# Patient Record
Sex: MALE | Race: WHITE | NOT HISPANIC OR LATINO | Employment: FULL TIME | URBAN - METROPOLITAN AREA
[De-identification: names, ages, dates, MRNs, and addresses within clinical notes are randomized per-mention and may not be internally consistent; named-entity substitution may affect disease eponyms.]

---

## 2017-01-20 DIAGNOSIS — F41.1 GENERALIZED ANXIETY DISORDER: Primary | ICD-10-CM

## 2017-01-20 RX ORDER — PAROXETINE 20 MG/1
20 TABLET, FILM COATED ORAL AT BEDTIME
Qty: 30 TABLET | Refills: 5 | Status: SHIPPED | OUTPATIENT
Start: 2017-01-20 | End: 2017-07-10

## 2017-01-20 NOTE — TELEPHONE ENCOUNTER
PAROXETINE 20MG          Last Written Prescription Date: 11/30/2016  Last Fill Quantity: 30, # refills: 0  [place] Office Visit with [facility] [place]:  11/2/16        Last PHQ-9 score on record=   PHQ-9 SCORE 7/29/2016   Total Score -   Total Score MyChart -   Total Score 9

## 2017-01-20 NOTE — TELEPHONE ENCOUNTER
Patient takes this for anxiety.  Prescription approved per [facility] Refill Protocol.  Sandra Lala, RN  Triage Nurse

## 2017-03-13 ENCOUNTER — OFFICE VISIT (OUTPATIENT)
Dept: FAMILY MEDICINE | Facility: CLINIC | Age: 37
End: 2017-03-13
Payer: COMMERCIAL

## 2017-03-13 VITALS
DIASTOLIC BLOOD PRESSURE: 75 MMHG | SYSTOLIC BLOOD PRESSURE: 104 MMHG | HEART RATE: 79 BPM | BODY MASS INDEX: 32.54 KG/M2 | TEMPERATURE: 98.5 F | WEIGHT: 214 LBS | RESPIRATION RATE: 18 BRPM | OXYGEN SATURATION: 98 %

## 2017-03-13 DIAGNOSIS — K44.9 HIATAL HERNIA: ICD-10-CM

## 2017-03-13 DIAGNOSIS — F33.0 MILD RECURRENT MAJOR DEPRESSION (H): ICD-10-CM

## 2017-03-13 DIAGNOSIS — M25.522 LEFT ELBOW PAIN: Primary | ICD-10-CM

## 2017-03-13 DIAGNOSIS — K21.9 GASTROESOPHAGEAL REFLUX DISEASE WITHOUT ESOPHAGITIS: ICD-10-CM

## 2017-03-13 PROCEDURE — 99214 OFFICE O/P EST MOD 30 MIN: CPT | Performed by: FAMILY MEDICINE

## 2017-03-13 RX ORDER — TRAMADOL HYDROCHLORIDE 50 MG/1
50-100 TABLET ORAL
Qty: 20 TABLET | Refills: 0 | Status: SHIPPED | OUTPATIENT
Start: 2017-03-13 | End: 2018-02-28

## 2017-03-13 NOTE — NURSING NOTE
"Chief Complaint   Patient presents with     Musculoskeletal Problem     sore elbows        Initial /75 (BP Location: Right arm, Patient Position: Chair, Cuff Size: Adult Regular)  Pulse 79  Temp 98.5  F (36.9  C) (Oral)  Resp 18  Wt 214 lb (97.1 kg)  SpO2 98%  BMI 32.54 kg/m2 Estimated body mass index is 32.54 kg/(m^2) as calculated from the following:    Height as of 12/29/15: 5' 8\" (1.727 m).    Weight as of this encounter: 214 lb (97.1 kg).  Medication Reconciliation: complete     Dane Wills MA      "

## 2017-03-13 NOTE — PATIENT INSTRUCTIONS
Tylenol 500 to 1000 mg every 8 hours as needed for pain, maximum daily dose is 3, 000 mg   Tramadol 50 to 100 mg every night as needed for pain, please do not drive or take it with alcohol  Continue physical therapy exercises  If your pain is not improving after 1-2 weeks, then I will refer you to the Sports medicine clinic for further evaluation

## 2017-03-13 NOTE — MR AVS SNAPSHOT
After Visit Summary   3/13/2017    Easton Coyne    MRN: [id number]           Patient Information     Date Of Birth          1980        Visit Information        Provider Department      3/13/2017 3:40 PM Flor Greenfield MD [facility]        Today's Diagnoses     Left elbow pain    -  1      Care Instructions    Tylenol 500 to 1000 mg every 8 hours as needed for pain, maximum daily dose is 3, 000 mg   Tramadol 50 to 100 mg every night as needed for pain, please do not drive or take it with alcohol  Continue physical therapy exercises  If your pain is not improving after 1-2 weeks, then I will refer you to the Sports medicine clinic for further evaluation         Follow-ups after your visit        Who to contact     If you have questions or need follow up information about today's clinic visit or your schedule please contact [facility] directly at [phone].  Normal or non-critical lab and imaging results will be communicated to you by MyChart, letter or phone within 4 business days after the clinic has received the results. If you do not hear from us within 7 days, please contact the clinic through [employer] or phone. If you have a critical or abnormal lab result, we will notify you by phone as soon as possible.  Submit refill requests through [employer] or call your pharmacy and they will forward the refill request to us. Please allow 3 business days for your refill to be completed.          Additional Information About Your Visit        MyChart Information     [employer] gives you secure access to your electronic health record. If you see a primary care provider, you can also send messages to your care team and make appointments. If you have questions, please call your primary care clinic.  If you do not have a primary care provider, please call [phone] and they will assist you.        Care EveryWhere ID     This is your Care EveryWhere ID. This could be used  by other organizations to access your [facility] medical records  [id number]        Your Vitals Were     Pulse Temperature Respirations Pulse Oximetry BMI (Body Mass Index)       79 98.5  F (36.9  C) (Oral) 18 98% 32.54 kg/m2        Blood Pressure from Last 3 Encounters:   03/13/17 104/75   11/22/16 128/68   11/02/16 120/80    Weight from Last 3 Encounters:   03/13/17 214 lb (97.1 kg)   11/22/16 204 lb (92.5 kg)   11/02/16 209 lb (94.8 kg)              Today, you had the following     No orders found for display         Today's Medication Changes          These changes are accurate as of: 3/13/17  4:20 PM.  If you have any questions, ask your nurse or doctor.               Start taking these medicines.        Dose/Directions    traMADol 50 MG tablet   Commonly known as:  ULTRAM   Used for:  Left elbow pain   Started by:  Flor Greenfield MD        Dose:   mg   Take 1-2 tablets ( mg) by mouth nightly as needed for pain maximum 2 tablet(s) per day   Quantity:  20 tablet   Refills:  0            Where to get your medicines      Some of these will need a paper prescription and others can be bought over the counter.  Ask your nurse if you have questions.     Bring a paper prescription for each of these medications     traMADol 50 MG tablet                Primary Care Provider    None Specified       No primary provider on file.        Thank you!     Thank you for choosing [facility]  for your care. Our goal is always to provide you with excellent care. Hearing back from our patients is one way we can continue to improve our services. Please take a few minutes to complete the written survey that you may receive in the mail after your visit with us. Thank you!             Your Updated Medication List - Protect others around you: Learn how to safely use, store and throw away your medicines at www.disposemymeds.org.          This list is accurate as of: 3/13/17  4:20 PM.  Always use your most  recent med list.                   Brand Name Dispense Instructions for use    cyclobenzaprine 10 MG tablet    FLEXERIL    30 tablet    Take 1 tablet (10 mg) by mouth 3 times daily as needed for muscle spasms       LORazepam 0.5 MG tablet    ATIVAN    20 tablet    Take 0.5-1 tablets (0.25-0.5 mg) by mouth every 8 hours as needed for anxiety Take 30 minutes prior to departure.  Do not operate a vehicle after taking this medication       PARoxetine 20 MG tablet    PAXIL    30 tablet    Take 1 tablet (20 mg) by mouth At Bedtime       ranitidine 150 MG tablet    ZANTAC    60 tablet    Take 1 tablet (150 mg) by mouth 2 times daily       traMADol 50 MG tablet    ULTRAM    20 tablet    Take 1-2 tablets ( mg) by mouth nightly as needed for pain maximum 2 tablet(s) per day       varenicline 0.5 MG X 11 & 1 MG X 42 tablet    CHANTIX STARTING MONTH LING    53 tablet    Take 0.5 mg tab daily for 3 days, then 0.5 mg tab twice daily for 4 days, then 1 mg twice daily.

## 2017-03-13 NOTE — PROGRESS NOTES
SUBJECTIVE:                                                    Easton Coyne is a 36 year old male who presents to clinic today for the following health issues:      Musculoskeletal problem/pain      Duration: 2 weeks ago     Description  Location: right elbow, intermittent, worse at night and in the morning     Intensity:  severe    Accompanying signs and symptoms: swelling     History  Previous similar problem: yes, elbow tendonitis, PT didn't help, cortisone mildly helped, rest helped the most   Previous surgery for pinched nerves   Previous evaluation:  No recent     Precipitating or alleviating factors:  Trauma or overuse: no   Aggravating factors include: lifting and moving arm     Therapies tried and outcome: tendon band, ice and tiger balm- not improving    Pt is a .       Problem list and histories reviewed & adjusted, as indicated.  Additional history: as documented        Reviewed and updated as needed this visit by clinical staff       Reviewed and updated as needed this visit by Provider         ROS:  Constitutional, HEENT, cardiovascular, pulmonary, gi and gu systems are negative, except as otherwise noted.    OBJECTIVE:                                                    /75 (BP Location: Right arm, Patient Position: Chair, Cuff Size: Adult Regular)  Pulse 79  Temp 98.5  F (36.9  C) (Oral)  Resp 18  Wt 214 lb (97.1 kg)  SpO2 98%  BMI 32.54 kg/m2  Body mass index is 32.54 kg/(m^2).  GENERAL: healthy, alert and no distress  EYES: Eyes grossly normal to inspection  HENT: nose and mouth without ulcers or lesions  MS: no gross musculoskeletal defects noted, no edema, + left elbow tenderness, normal ROM  PSYCH: mentation appears normal, affect normal/bright    Diagnostic Test Results:  none      ASSESSMENT/PLAN:                                                      ## Left elbow pain  - H/o GERD and hiatal hernia and intolerant to NSAIDs; likely symptoms are due to meningopathy  - Due to above  h/o, will avoid NSAIDs and use Tylenol 500 to 1000 mg every 8 hours as needed for pain   - traMADol (ULTRAM) 50 MG tablet; Take 1-2 tablets ( mg) by mouth nightly as needed for pain maximum 2 tablet(s) per day  Dispense: 20 tablet; Refill: 0; cautioned about sedating side effect, not to drive or drink alcohol while on medication.   - If not improving then will refer to sports medicine clinic     ## MDD:  PHQ-9 SCORE 2/29/2016 7/29/2016 3/13/2017   Total Score - - -   Total Score MyChart - - -   Total Score 7 9 3     - Pt due for PHQ9, stable, continue current antidepressant       Flor Greenfield MD  [facility]

## 2017-03-14 ASSESSMENT — PATIENT HEALTH QUESTIONNAIRE - PHQ9: SUM OF ALL RESPONSES TO PHQ QUESTIONS 1-9: 3

## 2017-07-10 DIAGNOSIS — F41.1 GENERALIZED ANXIETY DISORDER: ICD-10-CM

## 2017-07-10 NOTE — TELEPHONE ENCOUNTER
PAROXETINE 20mg     Last Written Prescription Date: 1/20/2017  Last Fill Quantity: 30, # refills: 5  Last Office Visit with [facility] primary care provider:  3/13/2017        Last PHQ-9 score on record=   PHQ-9 SCORE 3/13/2017   Total Score MyChart -   Total Score 3

## 2017-07-12 RX ORDER — PAROXETINE 20 MG/1
20 TABLET, FILM COATED ORAL AT BEDTIME
Qty: 30 TABLET | Refills: 5 | Status: SHIPPED | OUTPATIENT
Start: 2017-07-12 | End: 2018-01-22

## 2017-07-12 NOTE — TELEPHONE ENCOUNTER
Prescription approved per [facility] Refill Protocol.  Patient takes this for anxiety.  Sandra Lala, RN  Triage Nurse

## 2017-12-05 ENCOUNTER — TRANSFERRED RECORDS (OUTPATIENT)
Dept: HEALTH INFORMATION MANAGEMENT | Facility: CLINIC | Age: 37
End: 2017-12-05

## 2017-12-22 ENCOUNTER — TRANSFERRED RECORDS (OUTPATIENT)
Dept: HEALTH INFORMATION MANAGEMENT | Facility: CLINIC | Age: 37
End: 2017-12-22

## 2018-01-08 ENCOUNTER — TRANSFERRED RECORDS (OUTPATIENT)
Dept: HEALTH INFORMATION MANAGEMENT | Facility: CLINIC | Age: 38
End: 2018-01-08

## 2018-01-19 DIAGNOSIS — M79.18 MYOFASCIAL PAIN: ICD-10-CM

## 2018-01-19 DIAGNOSIS — F41.1 GENERALIZED ANXIETY DISORDER: ICD-10-CM

## 2018-01-19 RX ORDER — CYCLOBENZAPRINE HCL 10 MG
10 TABLET ORAL 3 TIMES DAILY PRN
Qty: 30 TABLET | Refills: 0 | Status: SHIPPED | OUTPATIENT
Start: 2018-01-19

## 2018-01-19 NOTE — TELEPHONE ENCOUNTER
"Requested Prescriptions   Pending Prescriptions Disp Refills     PARoxetine (PAXIL) 20 MG tablet [Pharmacy Med Name: PAROXETINE 20MG TABLETS]  Last Written Prescription Date:  07/12/2017  Last Fill Quantity: 30 tablets,  # refills: 5   Last Office Visit with [facility] or [facility] prescribing provider: 03/13/2017  Future Office Visit:    30 tablet 0     Sig: TAKE 1 TABLET(20 MG) BY MOUTH AT BEDTIME    SSRIs Protocol Failed    1/19/2018  1:49 PM   PHQ-9 SCORE 2/29/2016 7/29/2016 3/13/2017   Total Score - - -   Total Score MyChart - - -   Total Score 7 9 3     DELROY-7 SCORE 8/28/2015 7/29/2016 11/22/2016   Total Score - - -   Total Score 1 12 7           Failed - PHQ-9 score less than 5 in past 6 months    The PHQ-9 criteria is meant to fail. It requires a PHQ-9 score review         Failed - Recent or future visit with authorizing provider    Patient had office visit in the last year or has a visit in the next 30 days with authorizing provider.  See \"Patient Info\" tab in inbasket, or \"Choose Columns\" in Meds & Orders section of the refill encounter.            Failed - Recent (6 mo) or future visit with authorizing provider's specialty    Patient had office visit in the last 6 months or has a visit in the next 30 days with authorizing provider.  See \"Patient Info\" tab in inbasket, or \"Choose Columns\" in Meds & Orders section of the refill encounter.           Passed - Patient is age 18 or older          "

## 2018-01-20 DIAGNOSIS — F41.1 GENERALIZED ANXIETY DISORDER: ICD-10-CM

## 2018-01-20 NOTE — TELEPHONE ENCOUNTER
"Requested Prescriptions   Pending Prescriptions Disp Refills     PARoxetine (PAXIL) 20 MG tablet [Pharmacy Med Name: PAROXETINE 20MG TABLETS]  Last Written Prescription Date:  7/12/17  Last Fill Quantity: 30,  # refills: 5   Last Office Visit with [facility] or [facility] prescribing provider:  3/13/17   Future Office Visit:      30 tablet 0     Sig: TAKE 1 TABLET(20 MG) BY MOUTH AT BEDTIME    SSRIs Protocol Failed    1/20/2018  4:21 PM   PHQ-9 SCORE 2/29/2016 7/29/2016 3/13/2017   Total Score - - -   Total Score MyChart - - -   Total Score 7 9 3     DELROY-7 SCORE 8/28/2015 7/29/2016 11/22/2016   Total Score - - -   Total Score 1 12 7           Failed - PHQ-9 score less than 5 in past 6 months    The PHQ-9 criteria is meant to fail. It requires a PHQ-9 score review         Failed - Recent or future visit with authorizing provider    Patient had office visit in the last year or has a visit in the next 30 days with authorizing provider.  See \"Patient Info\" tab in inbasket, or \"Choose Columns\" in Meds & Orders section of the refill encounter.            Failed - Recent (6 mo) or future visit with authorizing provider's specialty    Patient had office visit in the last 6 months or has a visit in the next 30 days with authorizing provider.  See \"Patient Info\" tab in inbasket, or \"Choose Columns\" in Meds & Orders section of the refill encounter.           Passed - Patient is age 18 or older          "

## 2018-01-21 ENCOUNTER — MYC MEDICAL ADVICE (OUTPATIENT)
Dept: PEDIATRICS | Facility: CLINIC | Age: 38
End: 2018-01-21

## 2018-01-22 DIAGNOSIS — F41.1 GENERALIZED ANXIETY DISORDER: ICD-10-CM

## 2018-01-22 RX ORDER — PAROXETINE 20 MG/1
TABLET, FILM COATED ORAL
Qty: 30 TABLET | Refills: 0 | OUTPATIENT
Start: 2018-01-22

## 2018-01-22 RX ORDER — PAROXETINE 20 MG/1
20 TABLET, FILM COATED ORAL AT BEDTIME
Qty: 90 TABLET | Refills: 0 | Status: SHIPPED | OUTPATIENT
Start: 2018-01-22 | End: 2018-02-28

## 2018-01-22 NOTE — TELEPHONE ENCOUNTER
Spoke with Dr. Day and he advised to have patient send it to the provider he is now seeing.   Trini Fair RN

## 2018-01-22 NOTE — TELEPHONE ENCOUNTER
Olgadled with Dr. Day and he advised to deny as has not been seen in 3 yrs by him.   Trini Fair RN

## 2018-02-28 ENCOUNTER — OFFICE VISIT (OUTPATIENT)
Dept: FAMILY MEDICINE | Facility: CLINIC | Age: 38
End: 2018-02-28
Payer: COMMERCIAL

## 2018-02-28 VITALS
DIASTOLIC BLOOD PRESSURE: 72 MMHG | TEMPERATURE: 97.5 F | SYSTOLIC BLOOD PRESSURE: 122 MMHG | HEIGHT: 68 IN | OXYGEN SATURATION: 97 % | BODY MASS INDEX: 34.86 KG/M2 | WEIGHT: 230 LBS | HEART RATE: 76 BPM

## 2018-02-28 DIAGNOSIS — K21.9 GASTROESOPHAGEAL REFLUX DISEASE WITHOUT ESOPHAGITIS: Primary | ICD-10-CM

## 2018-02-28 DIAGNOSIS — F41.1 GENERALIZED ANXIETY DISORDER: ICD-10-CM

## 2018-02-28 PROCEDURE — 99214 OFFICE O/P EST MOD 30 MIN: CPT | Performed by: INTERNAL MEDICINE

## 2018-02-28 RX ORDER — OMEPRAZOLE 40 MG/1
40 CAPSULE, DELAYED RELEASE ORAL DAILY
Qty: 30 CAPSULE | Refills: 1 | Status: SHIPPED | OUTPATIENT
Start: 2018-02-28 | End: 2019-06-17

## 2018-02-28 RX ORDER — PAROXETINE 20 MG/1
20 TABLET, FILM COATED ORAL AT BEDTIME
Qty: 90 TABLET | Refills: 3 | Status: SHIPPED | OUTPATIENT
Start: 2018-02-28 | End: 2019-06-04

## 2018-02-28 NOTE — PATIENT INSTRUCTIONS
"F Fermentable   O Oligosaccharides Fructans, galacto-oligosaccharides Wheat, barley, rye, onion, narinder, white part of spring onion, garlic, shallots, artichokes, beetroot, fennel, peas, chicory, pistachio, cashews, legumes, lentils, and chickpeas   D Disaccharides Lactose Milk, custard, ice cream, and yogurt   M Monosaccharides \"Free fructose\" (fructose in excess of glucose) Apples, pears, mangoes, cherries, watermelon, asparagus, sugar snap peas, honey, high-fructose corn syrup   A And   P Polyols Sorbitol, mannitol, maltitol, and xylitol Apples, pears, apricots, cherries, nectarines, peaches, plums, watermelon, mushrooms, cauliflower, artificially sweetened chewing gum and confectionery     Limiting dietary ingestion of known gas-producing foods such as cabbage, onions, broccoli, brussel sprouts, wheat, and potatoes     Try simethicone.      Take omeprazole 40mg     "

## 2018-02-28 NOTE — PROGRESS NOTES
SUBJECTIVE:   Easton Coyne is a 37 year old male who presents to clinic today for the following health issues:      ABDOMINAL PAIN     Onset: x couple months    Description:   Character: Fullness and bloating  Location: epigastric region  Radiation: None    Intensity: mild    Progression of Symptoms:  same    Accompanying Signs & Symptoms:  Fever/Chills?: no   Gas/Bloating: YES  Nausea: no   Vomitting: no   Diarrhea?: no   ConstipationYES  Dysuria or Hematuria: no   History:   Trauma: no   Previous similar pain: no    Previous tests done: none    Precipitating factors:   Does the pain change with:     Food: YES     BM: no     Urination: no     Alleviating factors:      Therapies Tried and outcome:     LMP:       Easton is here with upper abdominal bloating.  This seems to happen no matter what he eats, can last couple hours.  Belching a lot, but that does not always make him feel better.  He denies any dysphagia or odynophagia.  He has been taking omeprazole 20 mg daily for quite some time for heartburn, this works well to control the heartburn pain.  He is having daily bowel movements, and frequently constipated.  He denies nausea or vomiting.  His appetite is normal.  No recent changes with his health or medications.  He tried Gas-X over-the-counter which was not very helpful.    He had an EGD in 2016 which was unremarkable except a small hiatal hernia.  He had serum testing for H. pylori in 2015 which was also negative.    Also needs a refill of paroxetine today.  He has been taking this for years for anxiety, it works well to control symptoms.      Easton recently moved to [place] with his fiancée.  They are getting  in September.  He works as an . No kids.         Reviewed and updated as needed this visit by clinical staff  Tobacco  Allergies  Meds  Med Hx  Surg Hx  Fam Hx  Soc Hx      Reviewed and updated as needed this visit by Provider         ROS:  Const, cv, gi, psych reviewed,   "otherwise negative unless noted above.       OBJECTIVE:     /72  Pulse 76  Temp 97.5  F (36.4  C) (Tympanic)  Ht 5' 8\" (1.727 m)  Wt 230 lb (104.3 kg)  SpO2 97%  BMI 34.97 kg/m2  Body mass index is 34.97 kg/(m^2).    Gen: well appearing, pleasant man, no distress  HEENT: PERRL, sclera nonicteric, no oral lesions, MMM  Pulm: breathing comfortably, CTAB, no wheezes or rales  CV: RRR, normal S1 and S2, no murmurs  Abd: BS present, soft, nontender, nondistended  Ext:  no LE edema       ASSESSMENT/PLAN:       1. Gastroesophageal reflux disease without esophagitis  Epigastric bloating and gas.  Long history of GERD.  He has already had a recent EGD and testing for H pylori.  Recommended avoidance of certain dietary foods, see patient instructions.  Can try simethicone as well.  Trial of 4-8 weeks of increased dose of the omeprazole.  If this is not effective, may consider GI referral.  - omeprazole (PRILOSEC) 40 MG capsule; Take 1 capsule (40 mg) by mouth daily Take 30-60 minutes before a meal.  Dispense: 30 capsule; Refill: 1    2. GENERALIZED ANXIETY DIS  Doing well, refill ordered.  - PARoxetine (PAXIL) 20 MG tablet; Take 1 tablet (20 mg) by mouth At Bedtime  Dispense: 90 tablet; Refill: 3    Follow-up - let me know how he is doing in 6 weeks or so.    Georgina Adkins MD  [facility] [place]  "

## 2018-02-28 NOTE — NURSING NOTE
"Chief Complaint   Patient presents with     Gastric Problem     bloating       Initial /72  Pulse 76  Temp 97.5  F (36.4  C) (Tympanic)  Ht 5' 8\" (1.727 m)  Wt 230 lb (104.3 kg)  SpO2 97%  BMI 34.97 kg/m2 Estimated body mass index is 34.97 kg/(m^2) as calculated from the following:    Height as of this encounter: 5' 8\" (1.727 m).    Weight as of this encounter: 230 lb (104.3 kg).  Medication Reconciliation: complete   Radha Hopper CMA      "

## 2018-02-28 NOTE — MR AVS SNAPSHOT
"              After Visit Summary   2/28/2018    Easton Coyne    MRN: [id number]           Patient Information     Date Of Birth          1980        Visit Information        Provider Department      2/28/2018 3:20 PM Georgina Adkins MD [facility]        Today's Diagnoses     Gastroesophageal reflux disease without esophagitis    -  1    GENERALIZED ANXIETY DIS          Care Instructions    F Fermentable   O Oligosaccharides Fructans, galacto-oligosaccharides Wheat, barley, rye, onion, anrinder, white part of spring onion, garlic, shallots, artichokes, beetroot, fennel, peas, chicory, pistachio, cashews, legumes, lentils, and chickpeas   D Disaccharides Lactose Milk, custard, ice cream, and yogurt   M Monosaccharides \"Free fructose\" (fructose in excess of glucose) Apples, pears, mangoes, cherries, watermelon, asparagus, sugar snap peas, honey, high-fructose corn syrup   A And   P Polyols Sorbitol, mannitol, maltitol, and xylitol Apples, pears, apricots, cherries, nectarines, peaches, plums, watermelon, mushrooms, cauliflower, artificially sweetened chewing gum and confectionery     Limiting dietary ingestion of known gas-producing foods such as cabbage, onions, broccoli, brussel sprouts, wheat, and potatoes     Try simethicone.      Take omeprazole 40mg             Follow-ups after your visit        Follow-up notes from your care team     Return in about 1 month (around 3/28/2018) for if no improvement .      Who to contact     If you have questions or need follow up information about today's clinic visit or your schedule please contact [facility] directly at [phone].  Normal or non-critical lab and imaging results will be communicated to you by MyChart, letter or phone within 4 business days after the clinic has received the results. If you do not hear from us within 7 days, please contact the clinic through MyChart or phone. If you have a critical or abnormal lab " "result, we will notify you by phone as soon as possible.  Submit refill requests through [employer] or call your pharmacy and they will forward the refill request to us. Please allow 3 business days for your refill to be completed.          Additional Information About Your Visit        Tel[employer] Information     [employer] gives you secure access to your electronic health record. If you see a primary care provider, you can also send messages to your care team and make appointments. If you have questions, please call your primary care clinic.  If you do not have a primary care provider, please call [phone] and they will assist you.        Care EveryWhere ID     This is your Care EveryWhere ID. This could be used by other organizations to access your [place] medical records  [id number]        Your Vitals Were     Pulse Temperature Height Pulse Oximetry BMI (Body Mass Index)       76 97.5  F (36.4  C) (Tympanic) 5' 8\" (1.727 m) 97% 34.97 kg/m2        Blood Pressure from Last 3 Encounters:   02/28/18 122/72   03/13/17 104/75   11/22/16 128/68    Weight from Last 3 Encounters:   02/28/18 230 lb (104.3 kg)   03/13/17 214 lb (97.1 kg)   11/22/16 204 lb (92.5 kg)              Today, you had the following     No orders found for display         Today's Medication Changes          These changes are accurate as of 2/28/18  3:38 PM.  If you have any questions, ask your nurse or doctor.               These medicines have changed or have updated prescriptions.        Dose/Directions    * OMEPRAZOLE PO   This may have changed:  Another medication with the same name was added. Make sure you understand how and when to take each.   Changed by:  Georgina Adkins MD        Dose:  20 mg   Take 20 mg by mouth   Refills:  0       * omeprazole 40 MG capsule   Commonly known as:  priLOSEC   This may have changed:  You were already taking a medication with the same name, and this prescription was added. Make sure you understand how and " when to take each.   Used for:  Gastroesophageal reflux disease without esophagitis   Changed by:  Georgina Adkins MD        Dose:  40 mg   Take 1 capsule (40 mg) by mouth daily Take 30-60 minutes before a meal.   Quantity:  30 capsule   Refills:  1       * Notice:  This list has 2 medication(s) that are the same as other medications prescribed for you. Read the directions carefully, and ask your doctor or other care provider to review them with you.         Where to get your medicines      These medications were sent to [facility] [id number] - [place] - [address] AT [facility]  [address] [place] [address]     Phone:  [phone]     omeprazole 40 MG capsule    PARoxetine 20 MG tablet                Primary Care Provider Office Phone # Fax #    Georgina Adkins [phone]       [address] [facility] [address]        Equal Access to Services     [place] [facility] AH: Hadii aad ku hadasho Soomaali, waaxda luqadaha, qaybta kaalmada adeegyada, waxay idiin hayaan cinthia crouch . [facility] [phone].    ATENCIÓN: Si habla español, tiene a stokes disposición servicios gratuitos de asistencia lingüística. [facility] [phone].    We comply with applicable federal civil rights laws and [place] laws. We do not discriminate on the basis of race, color, national origin, age, disability, sex, sexual orientation, or gender identity.            Thank you!     Thank you for choosing [facility]  for your care. Our goal is always to provide you with excellent care. Hearing back from our patients is one way we can continue to improve our services. Please take a few minutes to complete the written survey that you may receive in the mail after your visit with us. Thank you!             Your Updated Medication List - Protect others around you: Learn how to safely use, store and throw away your medicines at www.disposemymeds.org.           This list is accurate as of 2/28/18  3:38 PM.  Always use your most recent med list.                   Brand Name Dispense Instructions for use Diagnosis    cyclobenzaprine 10 MG tablet    FLEXERIL    30 tablet    Take 1 tablet (10 mg) by mouth 3 times daily as needed for muscle spasms    Myofascial pain       * OMEPRAZOLE PO      Take 20 mg by mouth        * omeprazole 40 MG capsule    priLOSEC    30 capsule    Take 1 capsule (40 mg) by mouth daily Take 30-60 minutes before a meal.    Gastroesophageal reflux disease without esophagitis       PARoxetine 20 MG tablet    PAXIL    90 tablet    Take 1 tablet (20 mg) by mouth At Bedtime    Generalized anxiety disorder       varenicline 0.5 MG X 11 & 1 MG X 42 tablet    CHANTIX STARTING MONTH LING    53 tablet    Take 0.5 mg tab daily for 3 days, then 0.5 mg tab twice daily for 4 days, then 1 mg twice daily.    Tobacco use disorder       * Notice:  This list has 2 medication(s) that are the same as other medications prescribed for you. Read the directions carefully, and ask your doctor or other care provider to review them with you.

## 2018-03-01 ENCOUNTER — TELEPHONE (OUTPATIENT)
Dept: FAMILY MEDICINE | Facility: CLINIC | Age: 38
End: 2018-03-01

## 2018-03-01 DIAGNOSIS — K21.9 GASTROESOPHAGEAL REFLUX DISEASE WITHOUT ESOPHAGITIS: Primary | ICD-10-CM

## 2018-03-01 RX ORDER — ESOMEPRAZOLE MAGNESIUM 40 MG/1
40 CAPSULE, DELAYED RELEASE ORAL
Qty: 30 CAPSULE | Refills: 1 | Status: SHIPPED | OUTPATIENT
Start: 2018-03-01 | End: 2019-06-17

## 2018-03-01 NOTE — TELEPHONE ENCOUNTER
TC received another fax, this time it says     Esomprazole Magnesium 40 mg DR Caps       Not covered by patient plan. The preferred alternative is Omeprazole Cap MG. Please call the pharmacy to change medication  Along with strength, directions, quantity, and refills      This is the medication that was originally prescribed  Please call [facility] [phone] for clarification      Elisha VU

## 2018-03-01 NOTE — TELEPHONE ENCOUNTER
Omeprazole 40 mg Capsules    Drug not covered by patient plan. The preferred alternative is Esomepramagcapmgdr. Please call/fax the pharmacy to change medication along with strength, directions, quantity, and refills    Elisha VU

## 2018-03-02 NOTE — TELEPHONE ENCOUNTER
Please call the pharmacy and see if any are covered. If not, that's okay.  I told him he might have to just take 2 x 20 mg OTC tabs.      Georgina Adkins MD

## 2018-03-02 NOTE — TELEPHONE ENCOUNTER
Pharmacy states that the two prescriptions keep going back to one another.   PA needed for Esomprazole Magnesium 40 mg  And/or -  Omeprazole 40 mg   T - [phone]  ID # - [id number]

## 2018-03-06 NOTE — TELEPHONE ENCOUNTER
PRIOR AUTHORIZATION DENIED    Medication: Omeprazole 40 mg Capsules    Denial Date: 3/6/2018    Denial Rational:  Members insurance does not cover PPIs         Appeal Information: Member can call the number on the back of their card to start an appeal

## 2018-03-06 NOTE — TELEPHONE ENCOUNTER
Called patient and informed him insurance would not cover either and he could purchase OTC. Gracie Watts, CMA

## 2018-03-06 NOTE — TELEPHONE ENCOUNTER
Central Prior Authorization Team   Phone: [phone]    PA Initiation    Medication: Omeprazole 40 mg Capsules  Insurance Company: [employer] - Phone [phone] Fax [phone]  Pharmacy Filling the Rx: [facility] [address] [place] [address] - [facility] [address] AT [facility] [address]  Filling Pharmacy Phone: [phone]  Filling Pharmacy Fax:    Start Date: 3/6/2018    Waiting on question set on [facility]

## 2019-06-04 DIAGNOSIS — F41.1 GENERALIZED ANXIETY DISORDER: ICD-10-CM

## 2019-06-05 RX ORDER — PAROXETINE 20 MG/1
TABLET, FILM COATED ORAL
Qty: 30 TABLET | Refills: 0 | Status: SHIPPED | OUTPATIENT
Start: 2019-06-05 | End: 2019-06-17

## 2019-06-05 NOTE — TELEPHONE ENCOUNTER
A 30 day supply is given, patient is due for an office visit.  Please call to  assist the patient in scheduling an appointment.  YANNICK Sevilla, RN  [employer] [facility]

## 2019-06-05 NOTE — TELEPHONE ENCOUNTER
"Requested Prescriptions   Pending Prescriptions Disp Refills     PARoxetine (PAXIL) 20 MG tablet [Pharmacy Med Name: PAROXETINE 20MG TABLETS] 90 tablet 0     Sig: TAKE 1 TABLET(20 MG) BY MOUTH AT BEDTIME  Last Written Prescription Date:  2/28/18  Last Fill Quantity: 90,  # refills: 3   Last office visit: 2/28/2018 with prescribing provider:  Lucille   Future Office Visit:           SSRIs Protocol Failed - 6/4/2019  2:55 PM        Failed - Recent (12 mo) or future (30 days) visit within the authorizing provider's specialty     Patient had office visit in the last 12 months or has a visit in the next 30 days with authorizing provider or within the authorizing provider's specialty.  See \"Patient Info\" tab in inbasket, or \"Choose Columns\" in Meds & Orders section of the refill encounter.              Passed - Medication is active on med list        Passed - Patient is age 18 or older          "

## 2019-06-17 ENCOUNTER — OFFICE VISIT (OUTPATIENT)
Dept: FAMILY MEDICINE | Facility: CLINIC | Age: 39
End: 2019-06-17
Payer: COMMERCIAL

## 2019-06-17 VITALS
OXYGEN SATURATION: 97 % | WEIGHT: 213 LBS | TEMPERATURE: 98.3 F | HEART RATE: 80 BPM | RESPIRATION RATE: 14 BRPM | DIASTOLIC BLOOD PRESSURE: 66 MMHG | BODY MASS INDEX: 32.39 KG/M2 | SYSTOLIC BLOOD PRESSURE: 110 MMHG

## 2019-06-17 DIAGNOSIS — E78.1 HYPERTRIGLYCERIDEMIA: ICD-10-CM

## 2019-06-17 DIAGNOSIS — F41.1 GAD (GENERALIZED ANXIETY DISORDER): Primary | ICD-10-CM

## 2019-06-17 PROCEDURE — 99213 OFFICE O/P EST LOW 20 MIN: CPT | Performed by: INTERNAL MEDICINE

## 2019-06-17 RX ORDER — PAROXETINE 20 MG/1
20 TABLET, FILM COATED ORAL AT BEDTIME
Qty: 90 TABLET | Refills: 3 | Status: SHIPPED | OUTPATIENT
Start: 2019-06-17 | End: 2020-08-12

## 2019-06-17 ASSESSMENT — PATIENT HEALTH QUESTIONNAIRE - PHQ9
SUM OF ALL RESPONSES TO PHQ QUESTIONS 1-9: 4
5. POOR APPETITE OR OVEREATING: NOT AT ALL

## 2019-06-17 ASSESSMENT — ANXIETY QUESTIONNAIRES
5. BEING SO RESTLESS THAT IT IS HARD TO SIT STILL: SEVERAL DAYS
1. FEELING NERVOUS, ANXIOUS, OR ON EDGE: SEVERAL DAYS
7. FEELING AFRAID AS IF SOMETHING AWFUL MIGHT HAPPEN: NOT AT ALL
6. BECOMING EASILY ANNOYED OR IRRITABLE: SEVERAL DAYS
GAD7 TOTAL SCORE: 3
2. NOT BEING ABLE TO STOP OR CONTROL WORRYING: NOT AT ALL
3. WORRYING TOO MUCH ABOUT DIFFERENT THINGS: NOT AT ALL
IF YOU CHECKED OFF ANY PROBLEMS ON THIS QUESTIONNAIRE, HOW DIFFICULT HAVE THESE PROBLEMS MADE IT FOR YOU TO DO YOUR WORK, TAKE CARE OF THINGS AT HOME, OR GET ALONG WITH OTHER PEOPLE: NOT DIFFICULT AT ALL

## 2019-06-17 NOTE — Clinical Note
Can you please call Easton and let him know I ordered a lipid panel after the visit. It's been awhile since his triglycerides were checked.  Please have him do a fasting ing lab appt at his convenience in the next month or two. Thanks!

## 2019-06-17 NOTE — PROGRESS NOTES
"Subjective     Easton Coyne is a 38 year old male who presents to clinic today for the following health issues:    HPI   Anxiety Follow-Up    How are you doing with your anxiety since your last visit? stable    Are you having other symptoms that might be associated with anxiety? No    Have you had a significant life event? No     Are you feeling depressed? No    Do you have any concerns with your use of alcohol or other drugs? No     Taking paxil for anxiety.  This works well.  He denies side effects from the medication. He is not currently seeing a counselor, doesn't feel the need right now.     Social History     Tobacco Use     Smoking status: Former Smoker     Packs/day: 0.50     Years: 10.00     Pack years: 5.00     Last attempt to quit: 2014     Years since quittin.5     Smokeless tobacco: Never Used   Substance Use Topics     Alcohol use: Yes     Alcohol/week: 0.0 - 1.0 oz     Comment: \"slim to never\"     Drug use: No     DELROY-7 SCORE 2016   Total Score - - -   Total Score 12 7 3     PHQ 2016 3/13/2017 2019   PHQ-9 Total Score 9 3 4   Q9: Thoughts of better off dead/self-harm past 2 weeks Not at all Not at all Not at all             Reviewed and updated as needed this visit by Provider  Meds  Problems         Review of Systems   Psych, msk reviewed,  otherwise negative unless noted above.        Objective    /66 (BP Location: Left arm, Patient Position: Chair, Cuff Size: Adult Large)   Pulse 80   Temp 98.3  F (36.8  C) (Oral)   Resp 14   Wt 96.6 kg (213 lb)   SpO2 97%   BMI 32.39 kg/m    Body mass index is 32.39 kg/m .  Physical Exam   GENERAL: healthy, alert and no distress  PSYCH: mentation appears normal, affect normal/bright            Assessment & Plan     1. DELROY (generalized anxiety disorder)  Doing well on paxil.  Refills ordered   - PARoxetine (PAXIL) 20 MG tablet; Take 1 tablet (20 mg) by mouth At Bedtime  Dispense: 90 tablet; Refill: " 3    2. Hypertriglyceridemia  Due for lipid check, TG have been > 1000 in the past   - Lipid panel reflex to direct LDL Fasting; Future         Return in about 1 year (around 6/17/2020) for Physical Exam.    Georgina Adkins MD  [facility]

## 2019-06-18 ASSESSMENT — ANXIETY QUESTIONNAIRES: GAD7 TOTAL SCORE: 3

## 2019-06-18 NOTE — PROGRESS NOTES
Left message for pt to call back please schedule a fasting lab only appt in the next month or two.         Daphne Hwang MA

## 2019-07-19 DIAGNOSIS — E78.1 HYPERTRIGLYCERIDEMIA: ICD-10-CM

## 2019-07-19 LAB
CHOLEST SERPL-MCNC: 249 MG/DL
HDLC SERPL-MCNC: 22 MG/DL
LDLC SERPL CALC-MCNC: ABNORMAL MG/DL
LDLC SERPL DIRECT ASSAY-MCNC: 75 MG/DL
NONHDLC SERPL-MCNC: 227 MG/DL
TRIGL SERPL-MCNC: 2403 MG/DL

## 2019-07-19 PROCEDURE — 83721 ASSAY OF BLOOD LIPOPROTEIN: CPT | Performed by: INTERNAL MEDICINE

## 2019-07-19 PROCEDURE — 80061 LIPID PANEL: CPT | Performed by: INTERNAL MEDICINE

## 2019-07-19 PROCEDURE — 36415 COLL VENOUS BLD VENIPUNCTURE: CPT | Performed by: INTERNAL MEDICINE

## 2019-07-22 DIAGNOSIS — E78.1 HYPERTRIGLYCERIDEMIA: Primary | ICD-10-CM

## 2019-07-22 RX ORDER — GEMFIBROZIL 600 MG/1
600 TABLET, FILM COATED ORAL
Qty: 180 TABLET | Refills: 1 | Status: SHIPPED | OUTPATIENT
Start: 2019-07-22

## 2019-09-28 ENCOUNTER — HEALTH MAINTENANCE LETTER (OUTPATIENT)
Age: 39
End: 2019-09-28

## 2019-11-08 ENCOUNTER — ANCILLARY PROCEDURE (OUTPATIENT)
Dept: GENERAL RADIOLOGY | Facility: CLINIC | Age: 39
End: 2019-11-08
Attending: FAMILY MEDICINE
Payer: COMMERCIAL

## 2019-11-08 ENCOUNTER — OFFICE VISIT (OUTPATIENT)
Dept: ORTHOPEDICS | Facility: CLINIC | Age: 39
End: 2019-11-08
Payer: COMMERCIAL

## 2019-11-08 VITALS
SYSTOLIC BLOOD PRESSURE: 96 MMHG | DIASTOLIC BLOOD PRESSURE: 70 MMHG | BODY MASS INDEX: 32.28 KG/M2 | HEIGHT: 68 IN | WEIGHT: 213 LBS

## 2019-11-08 DIAGNOSIS — M25.531 WRIST PAIN, ACUTE, RIGHT: Primary | ICD-10-CM

## 2019-11-08 DIAGNOSIS — M25.531 WRIST PAIN, ACUTE, RIGHT: ICD-10-CM

## 2019-11-08 PROCEDURE — 73110 X-RAY EXAM OF WRIST: CPT | Mod: RT | Performed by: FAMILY MEDICINE

## 2019-11-08 PROCEDURE — 99204 OFFICE O/P NEW MOD 45 MIN: CPT | Performed by: FAMILY MEDICINE

## 2019-11-08 ASSESSMENT — MIFFLIN-ST. JEOR: SCORE: 1865.66

## 2019-11-08 NOTE — LETTER
11/8/2019         RE: Easton Coyne  [address] [place] [id number]        Dear Colleague,    Thank you for referring your patient, Easton Coyne, to the [facility]. Please see a copy of my visit note below.    HPI   [facility]   Clinic Visit s Nov 8, 2019    PCP: Georgina Adkins      Easton is a 39 year old male who is seen as self referral for   Chief Complaint   Patient presents with     Right Wrist - Pain       Injury: right wrist palmar pain that began after work on Monday, describes a tearing sensation in the crease between the thenar and hyperthenar muscles in the middle of the base of the palm.     Right hand dominant    Location of Pain: right hand and wrist palmar, radiating to elbow   Duration of Pain: acute, 4 day(s),   Rating of Pain at worst: 7/10  Rating of Pain Currently: 4/10  Pain is better with: activity avoidance   Pain is worse with: ADLs:  all activities, gripping and lifting  Treatment so far consists of: ice and ibuprofen  Associated symptoms: no distal numbness or tingling; denies swelling or warmth  Recent imaging completed: No recent imaging completed.  Prior History of related problems: CTS 1 year ago, TCO; also bilateral ulnar nerve debridement    Had fully recovered from carpal tunnel and elbow surgery in the past.  Describes no new use of tools or other activity.  Denies falls onto hand.    Social History: is employed as a/an automotive service      Past Medical History:   Diagnosis Date     Generalized anxiety disorder      Mild recurrent major depression (H)      Tobacco use disorder     Quit Smoking - 2/2011     Ulnar nerve neuropathy     Bilateral Elbows       Patient Active Problem List    Diagnosis Date Noted     Hiatal hernia 11/22/2016     Priority: Medium     Gastroesophageal reflux disease without esophagitis 11/22/2016     Priority: Medium     BMI 30-35 07/08/2015     Priority: Medium     Cervicalgia  "2015     Priority: Medium     Hypertriglyceridemia 2012     Priority: Medium     Mild recurrent major depression (H)      Priority: Medium     Generalized anxiety disorder      Priority: Medium       Family History   Problem Relation Age of Onset     Thrombophilia Father         Pulmonary Embolism     Diabetes Paternal Grandmother         type 2     Cancer - colorectal Paternal Grandfather      Cancer Maternal Aunt         Throat Cancer     Breast Cancer Maternal Aunt        Social History     Socioeconomic History     Marital status: Single     Spouse name: Not on file     Number of children: 0     Years of education: 12     Highest education level: Not on file   Occupational History     Occupation: Student     Employer: [employer]     Comment: [employer]   Social Needs     Financial resource strain: Not on file     Food insecurity:     Worry: Not on file     Inability: Not on file     Transportation needs:     Medical: Not on file     Non-medical: Not on file   Tobacco Use     Smoking status: Former Smoker     Packs/day: 0.50     Years: 10.00     Pack years: 5.00     Last attempt to quit: 2014     Years since quittin.9     Smokeless tobacco: Never Used   Substance and Sexual Activity     Alcohol use: Yes     Alcohol/week: 0.0 - 1.7 standard drinks     Comment: \"slim to never\"     Drug use: No     Past Surgical History:   Procedure Laterality Date     ELBOW SURGERY      left     HERNIA REPAIR, INGUINAL RT/LT  99    Left Inguinal Hernia Repair     RELEASE CARPAL TUNNEL  2011    Procedure:RELEASE CARPAL TUNNEL; Cubital Tunnel Decompression; Surgeon:ALF PARRA; [facility]     TRANSPOSITION ULNAR NERVE (ELBOW)  2011    Procedure:TRANSPOSITION ULNAR NERVE (ELBOW); Cubital Tunnel Decompression  ; Surgeon:ALF PARRA; [facility]             Review of Systems   Musculoskeletal: Positive for joint pain.   All other systems reviewed and " "are negative.        Physical Exam    BP 96/70   Ht 1.727 m (5' 8\")   Wt 96.6 kg (213 lb)   BMI 32.39 kg/m     Constitutional:well-developed, well-nourished, and in no distress.   Cardiovascular: Intact distal pulses.    Neurological: alert. Gait Normal:   Gait, station, stance, and balance appear normal for age  Skin: Skin is warm and dry.   Psychiatric: Mood and affect normal.   Respiratory: unlabored, speaks in full sentences  Lymph: no LAD, no lymphangitis          Right Hand Exam     Tenderness   Right hand tenderness location: Base of palm, mid thenar region.    Range of Motion   Wrist   Extension: normal   Flexion: normal   Pronation: normal   Supination: normal     Muscle Strength   Wrist extension: 5/5   Wrist flexion: 5/5   : 5/5     Tests   Phalen s Sign: negative  Tinel's sign (median nerve): negative    Other   Erythema: absent  Scars: present  Sensation: normal  Pulse: present    Comments:  Well-healed carpal tunnel surgery scar      Right Elbow Exam     Tenderness   The patient is experiencing no tenderness.     Range of Motion   Extension: normal   Flexion: normal   Pronation: normal   Supination: normal     Muscle Strength   The patient has normal right elbow strength.    Tests   Varus: negative  Valgus: negative  Tinel's sign (cubital tunnel): positive    Other   Erythema: absent  Scars: present  Sensation: normal  Pulse: present                 X-ray images Ordered and independently reviewed by me in the office today with the patient. X-ray shows:     Recent Results (from the past 744 hour(s))   XR Wrist Right G/E 3 Views    Narrative    11/8/2019    No fracture, dislocation and or lesions. Normal alignment.         ASSESSMENT/PLAN    ICD-10-CM    1. Wrist pain, acute, right M25.531 XR Wrist Right G/E 3 Views     Mid palmar wrist pain, with a history of carpal tunnel and ulnar nerve surgery, with unremarkable x-rays and minimal findings on exam.  Suspect scar tissue adhesions breaking down " given vigorous use of the hands.  Otherwise wrist appears stable.  No sign of scapholunate tear.  Suggested trial of bracing, consider MRI if persistent.    Again, thank you for allowing me to participate in the care of your patient.        Sincerely,        Tye Villegas MD

## 2020-03-24 ENCOUNTER — E-VISIT (OUTPATIENT)
Dept: FAMILY MEDICINE | Facility: CLINIC | Age: 40
End: 2020-03-24
Payer: COMMERCIAL

## 2020-03-24 DIAGNOSIS — J01.00 ACUTE NON-RECURRENT MAXILLARY SINUSITIS: Primary | ICD-10-CM

## 2020-03-24 PROCEDURE — 99421 OL DIG E/M SVC 5-10 MIN: CPT | Performed by: INTERNAL MEDICINE

## 2021-01-10 ENCOUNTER — HEALTH MAINTENANCE LETTER (OUTPATIENT)
Age: 41
End: 2021-01-10

## 2021-02-25 DIAGNOSIS — F41.1 GAD (GENERALIZED ANXIETY DISORDER): ICD-10-CM

## 2021-02-25 RX ORDER — PAROXETINE 20 MG/1
TABLET, FILM COATED ORAL
Qty: 90 TABLET | Refills: 2 | Status: SHIPPED | OUTPATIENT
Start: 2021-02-25 | End: 2022-01-05

## 2021-10-23 ENCOUNTER — HEALTH MAINTENANCE LETTER (OUTPATIENT)
Age: 41
End: 2021-10-23

## 2022-01-05 ENCOUNTER — E-VISIT (OUTPATIENT)
Dept: FAMILY MEDICINE | Facility: CLINIC | Age: 42
End: 2022-01-05
Payer: COMMERCIAL

## 2022-01-05 DIAGNOSIS — F41.1 GAD (GENERALIZED ANXIETY DISORDER): ICD-10-CM

## 2022-01-05 DIAGNOSIS — E78.1 HYPERTRIGLYCERIDEMIA: Primary | ICD-10-CM

## 2022-01-05 PROCEDURE — 99421 OL DIG E/M SVC 5-10 MIN: CPT | Performed by: INTERNAL MEDICINE

## 2022-01-05 RX ORDER — PAROXETINE 20 MG/1
20 TABLET, FILM COATED ORAL AT BEDTIME
Qty: 90 TABLET | Refills: 1 | Status: SHIPPED | OUTPATIENT
Start: 2022-01-05

## 2022-01-05 ASSESSMENT — ANXIETY QUESTIONNAIRES
7. FEELING AFRAID AS IF SOMETHING AWFUL MIGHT HAPPEN: NOT AT ALL
3. WORRYING TOO MUCH ABOUT DIFFERENT THINGS: NOT AT ALL
5. BEING SO RESTLESS THAT IT IS HARD TO SIT STILL: NOT AT ALL
4. TROUBLE RELAXING: SEVERAL DAYS
GAD7 TOTAL SCORE: 2
8. IF YOU CHECKED OFF ANY PROBLEMS, HOW DIFFICULT HAVE THESE MADE IT FOR YOU TO DO YOUR WORK, TAKE CARE OF THINGS AT HOME, OR GET ALONG WITH OTHER PEOPLE?: NOT DIFFICULT AT ALL
1. FEELING NERVOUS, ANXIOUS, OR ON EDGE: SEVERAL DAYS
7. FEELING AFRAID AS IF SOMETHING AWFUL MIGHT HAPPEN: NOT AT ALL
GAD7 TOTAL SCORE: 2
GAD7 TOTAL SCORE: 2
2. NOT BEING ABLE TO STOP OR CONTROL WORRYING: NOT AT ALL
6. BECOMING EASILY ANNOYED OR IRRITABLE: NOT AT ALL

## 2022-01-05 ASSESSMENT — PATIENT HEALTH QUESTIONNAIRE - PHQ9
10. IF YOU CHECKED OFF ANY PROBLEMS, HOW DIFFICULT HAVE THESE PROBLEMS MADE IT FOR YOU TO DO YOUR WORK, TAKE CARE OF THINGS AT HOME, OR GET ALONG WITH OTHER PEOPLE: NOT DIFFICULT AT ALL
SUM OF ALL RESPONSES TO PHQ QUESTIONS 1-9: 3
SUM OF ALL RESPONSES TO PHQ QUESTIONS 1-9: 3

## 2022-01-06 ASSESSMENT — ANXIETY QUESTIONNAIRES: GAD7 TOTAL SCORE: 2

## 2022-01-06 ASSESSMENT — PATIENT HEALTH QUESTIONNAIRE - PHQ9: SUM OF ALL RESPONSES TO PHQ QUESTIONS 1-9: 3

## 2022-02-12 ENCOUNTER — HEALTH MAINTENANCE LETTER (OUTPATIENT)
Age: 42
End: 2022-02-12

## 2022-10-10 ENCOUNTER — HEALTH MAINTENANCE LETTER (OUTPATIENT)
Age: 42
End: 2022-10-10

## 2022-11-11 ENCOUNTER — TRANSFERRED RECORDS (OUTPATIENT)
Dept: HEALTH INFORMATION MANAGEMENT | Facility: CLINIC | Age: 42
End: 2022-11-11

## 2023-02-18 ENCOUNTER — HEALTH MAINTENANCE LETTER (OUTPATIENT)
Age: 43
End: 2023-02-18

## 2023-10-26 PROCEDURE — 88305 TISSUE EXAM BY PATHOLOGIST: CPT | Mod: 26 | Performed by: PATHOLOGY

## 2023-10-26 PROCEDURE — 88305 TISSUE EXAM BY PATHOLOGIST: CPT | Mod: TC,ORL | Performed by: SURGERY

## 2023-10-27 ENCOUNTER — LAB REQUISITION (OUTPATIENT)
Dept: LAB | Facility: CLINIC | Age: 43
End: 2023-10-27
Payer: COMMERCIAL

## 2023-10-30 LAB
PATH REPORT.COMMENTS IMP SPEC: NORMAL
PATH REPORT.COMMENTS IMP SPEC: NORMAL
PATH REPORT.FINAL DX SPEC: NORMAL
PATH REPORT.GROSS SPEC: NORMAL
PATH REPORT.MICROSCOPIC SPEC OTHER STN: NORMAL
PATH REPORT.RELEVANT HX SPEC: NORMAL
PHOTO IMAGE: NORMAL

## 2024-03-16 ENCOUNTER — HEALTH MAINTENANCE LETTER (OUTPATIENT)
Age: 44
End: 2024-03-16

## 2024-04-24 ENCOUNTER — MYC MEDICAL ADVICE (OUTPATIENT)
Dept: UROLOGY | Facility: CLINIC | Age: 44
End: 2024-04-24
Payer: COMMERCIAL

## 2024-05-16 ENCOUNTER — VIRTUAL VISIT (OUTPATIENT)
Dept: UROLOGY | Facility: CLINIC | Age: 44
End: 2024-05-16
Payer: COMMERCIAL

## 2024-05-16 DIAGNOSIS — Z30.09 VASECTOMY EVALUATION: Primary | ICD-10-CM

## 2024-05-16 PROCEDURE — 99203 OFFICE O/P NEW LOW 30 MIN: CPT | Mod: 95 | Performed by: UROLOGY

## 2024-05-16 ASSESSMENT — PAIN SCALES - GENERAL: PAINLEVEL: NO PAIN (0)

## 2024-05-16 NOTE — PROGRESS NOTES
VASECTOMY CONSULTATION NOTE  DATE OF VISIT: 5/16/2024  MAHOGANY FERRER   PATIENT NAME: Easton Coyne    YOB: 1980      REASON FOR CONSULTATION: Mr. Easton Coyne is a 43 year old year old gentleman who is seen today requesting a vasectomy. He has 2 children - 4 year old girl and 5 week old boy - and he wishes to have a vasectomy for birth control. His wife is in agreement with this plan.     PAST MEDICAL HISTORY:   Past Medical History:   Diagnosis Date    Generalized anxiety disorder     Mild recurrent major depression (H24)     Tobacco use disorder     Quit Smoking - 2/2011    Ulnar nerve neuropathy     Bilateral Elbows       PAST SURGICAL HISTORY:   Past Surgical History:   Procedure Laterality Date    ELBOW SURGERY      left    HERNIA REPAIR, INGUINAL RT/LT  9/09/99    Left Inguinal Hernia Repair    RELEASE CARPAL TUNNEL  6/2/2011    Procedure:RELEASE CARPAL TUNNEL; Cubital Tunnel Decompression; Surgeon:ALF PARRA; [facility]    TRANSPOSITION ULNAR NERVE (ELBOW)  4/27/2011    Procedure:TRANSPOSITION ULNAR NERVE (ELBOW); Cubital Tunnel Decompression  ; Surgeon:ALF PARRA; [facility]       MEDICATIONS:   Current Outpatient Medications:     PARoxetine (PAXIL) 20 MG tablet, Take 1 tablet (20 mg) by mouth At Bedtime, Disp: 90 tablet, Rfl: 1    cyclobenzaprine (FLEXERIL) 10 MG tablet, Take 1 tablet (10 mg) by mouth 3 times daily as needed for muscle spasms (Patient not taking: Reported on 6/17/2019), Disp: 30 tablet, Rfl: 0    gemfibrozil (LOPID) 600 MG tablet, Take 1 tablet (600 mg) by mouth 2 times daily (before meals), Disp: 180 tablet, Rfl: 1    ALLERGIES:   Allergies   Allergen Reactions    Sertraline Nausea     and headache       FAMILY HISTORY:   Family History   Problem Relation Age of Onset    Thrombophilia Father         Pulmonary Embolism    Diabetes Paternal Grandmother         type 2    Cancer - colorectal Paternal Grandfather     Cancer Maternal Aunt         Throat Cancer     "Breast Cancer Maternal Aunt        SOCIAL HISTORY:   Social History     Socioeconomic History    Marital status:      Spouse name: Not on file    Number of children: 0    Years of education: 12    Highest education level: Not on file   Occupational History    Occupation: Student     Employer: [id number]     Comment: [employer]   Tobacco Use    Smoking status: Former     Current packs/day: 0.00     Average packs/day: 0.5 packs/day for 10.0 years (5.0 ttl pk-yrs)     Types: Cigarettes     Start date: 2004     Quit date: 2014     Years since quittin.4    Smokeless tobacco: Never   Substance and Sexual Activity    Alcohol use: Yes     Alcohol/week: 0.0 - 1.7 standard drinks of alcohol     Comment: \"slim to never\"    Drug use: No    Sexual activity: Yes     Partners: Female   Other Topics Concern    Parent/sibling w/ CABG, MI or angioplasty before 65F 55M? No     Service Not Asked    Blood Transfusions Not Asked    Caffeine Concern Not Asked     Comment: Tries not to drink caffiene    Occupational Exposure Not Asked    Hobby Hazards Not Asked    Sleep Concern Not Asked    Stress Concern Not Asked    Weight Concern Not Asked    Special Diet Not Asked    Back Care Not Asked    Exercise Not Asked     Comment: Walks daily with his dogs    Bike Helmet Not Asked    Seat Belt Not Asked    Self-Exams Not Asked   Social History Narrative    Not on file     Social Determinants of Health     Financial Resource Strain: Not on file   Food Insecurity: Not on file   Transportation Needs: Not on file   Physical Activity: Not on file   Stress: Not on file   Social Connections: Not on file   Interpersonal Safety: Not on file   Housing Stability: Not on file       PHYSICAL EXAM  Patient is a 43 year old  male   Vitals: There were no vitals taken for this visit.  There is no height or weight on file to calculate BMI.  General Appearance Adult:   Alert, no acute distress, " oriented  Neuro: Alert, oriented, speech and mentation normal  Psych: affect and mood normal       DIAGNOSIS: Request for sterilization    PLAN: The risks of the procedure as well as expectations for recovery and outcomes were explained in detail to him.  He was counseled on the risks for bleeding infection and pain after the procedure. We discussed the risk of post-vasectomy pain syndrome.  He was instructed to continue to use contraception until he had proven azoospermia on a semen specimen.  This would normally be collected at least 3 months after the procedure. Also discussed the rare, but possible risk of re-canalization of the vas, even after successful vasectomy with sterile semen specimen.  He was instructed to hold all anticoagulants medications for one week prior to the procedure.  It was recommended that he have someone else drive him home after his vasectomy.  In light of these risks and expectations he would like to proceed.  We are scheduling a vasectomy in the office in the near future.    Pt. Understands:  -1/1000-1/3000 risk of future pregnancy even with perfectly done vasectomy  -vasectomy is a permanent procedure    -he may cryopreserve sperm if he wishes   -1-5% risk of post-vasectomy pain syndrome   -1-5% risk of complication, primarily infection or bleeding  - he needs to have a semen sample that shows no sperm before getting approval for unprotected intercourse.      Thank you for the kind consultation.    Time spent: 15 minutes spent on the date of the encounter doing chart review, history and exam, documentation and further activities as noted above.     Montrell Borrero MD   Urology  [facility]  Clinic Phone [phone]          Virtual Visit Details    Type of service:  Video Visit     Originating Location (pt. Location): Home    Distant Location (provider location):  On-site  Platform used for Video Visit: Blanca

## 2024-05-16 NOTE — NURSING NOTE
Is the patient currently in the state of [place]? YES    Visit mode:VIDEO    If the visit is dropped, the patient can be reconnected by: VIDEO VISIT: Text to cell phone:   Telephone Information:   Mobile [phone]       Will anyone else be joining the visit? NO  (If patient encounters technical issues they should call [phone] :[phone])    How would you like to obtain your AVS? MyChart    Are changes needed to the allergy or medication list? No    Are refills needed on medications prescribed by this physician? NO    Reason for visit: Consult (VASECTOMY CONSULT)    Ayah JIMENEZ

## 2024-05-17 ENCOUNTER — TELEPHONE (OUTPATIENT)
Dept: UROLOGY | Facility: CLINIC | Age: 44
End: 2024-05-17
Payer: COMMERCIAL

## 2024-05-17 NOTE — TELEPHONE ENCOUNTER
Hello, this patient completed a Vasectomy consult with Dr. Borrero yesterday and he is interested in scheduling on a Friday? Can someone please contact patient to schedule or let him know his availability in [place] on Fridays?     Thank you!     Vijaya cisneros Complex   Dermatology, Surgery, Urology  [facility]

## 2024-06-28 ENCOUNTER — OFFICE VISIT (OUTPATIENT)
Dept: UROLOGY | Facility: CLINIC | Age: 44
End: 2024-06-28
Payer: COMMERCIAL

## 2024-06-28 VITALS — DIASTOLIC BLOOD PRESSURE: 92 MMHG | HEART RATE: 74 BPM | SYSTOLIC BLOOD PRESSURE: 138 MMHG | OXYGEN SATURATION: 96 %

## 2024-06-28 DIAGNOSIS — Z30.2 ENCOUNTER FOR STERILIZATION: Primary | ICD-10-CM

## 2024-06-28 PROCEDURE — 88302 TISSUE EXAM BY PATHOLOGIST: CPT | Performed by: PATHOLOGY

## 2024-06-28 PROCEDURE — 55250 REMOVAL OF SPERM DUCT(S): CPT | Performed by: UROLOGY

## 2024-06-28 RX ORDER — LIDOCAINE HYDROCHLORIDE 10 MG/ML
10 INJECTION, SOLUTION EPIDURAL; INFILTRATION; INTRACAUDAL; PERINEURAL ONCE
Status: COMPLETED | OUTPATIENT
Start: 2024-06-28 | End: 2024-06-28

## 2024-06-28 RX ADMIN — LIDOCAINE HYDROCHLORIDE 10 ML: 10 INJECTION, SOLUTION EPIDURAL; INFILTRATION; INTRACAUDAL; PERINEURAL at 11:11

## 2024-06-28 NOTE — LETTER
6/28/2024       RE: Easton Coyne  [address] [place] [address]     Dear Colleague,    Thank you for referring your patient, Easton Coyne, to the [facility]. Please see a copy of my visit note below.    OFFICE VASECTOMY OPERATIVE NOTE  [facility]     DATE: 06/28/24  PATIENT: Easton Coyne    YOB: 1980    Easton Coyne is a 43 year old male.  He has 2 children and he wishes a vasectomy for birth control. His wife joins for the procedure. He has read the brochure and he has shaved himself.  I reviewed the vasectomy procedure with him explaining that it would be done with a local anesthetic given just in the location where the vasectomy would be done.  It would be done through incisions with the removal of segments of the vasa, cauterization of the ends, and burying the ends separate with sutures.      Pt. Understands:  -1/1000-1/3000 risk of future pregnancy even with perfectly done vasectomy  -vasectomy is a permanent procedure    -he may cryopreserve sperm if he wishes   -1-5% risk of post-vasectomy pain syndrome   -1-5% risk of complication, primarily infection or bleeding  - he needs to have a semen sample that shows no sperm before getting approval for unprotected intercourse.      Complications such as bleeding, infection, and damage to other tissues in the area were discussed. I recommended that an ice bag be placed on the scrotum off and on tonight to help reduce pain and swelling.      He was reminded that he was not sterile immediately after the vasectomy that it would take at least 20 ejaculations to empty the vas of any remaining sperm.  He was not to provide a semen sample until after the 20th ejaculation and not before 12 weeks after the vas. He was  to fulfill both of those requirements.   He understands it is his responsibility to find out the results of the vas before proceeding with  Spoke with pt. Pt wanted to know the results of her vaginosis culture. Advised pt that the results were still in process. Pt verbalized understanding.    intercourse without birth control protection.  Other items discussed were activity afterwards, returning to work, voluntary physical activity,  resuming sexual activity, clothing to wear, bathing, and care of the vas site and expected changes in the site as healing progresses.  After signing the permit, bilateral vasectomy was done as described below.     ANESTHESIA: Local    DETAILS OF PROCEDURE: The risks of the procedure were explained in detail to the patient and informed consent was obtained. The patient was placed supine on the procedure table and the penis and scrotum were prepped and draped in the standard sterile fashion. The right vas deferens was isolated and brought up to the skin. 1% lidocaine local anesthesia was used to infiltrate the skin and the spermatic cord. A small incision was created and adventitial tissues were swept away from the vas. A 1 cm segment of the vas was excised and sent for pathology. The proximal and distal lumina of the vas were cauterized and then each segment was tied off in a knuckling-fashion with a 3-0 vicryl suture. Hemostasis was ensured and the segments were released back into the scrotum. Meticulous hemostasis was achieved. The skin was closed with 3-0 chromic suture in a horizontal mattress fashion. Next the left vas was brought to the skin and a vasectomy was performed in the similar fashion. The skin was closed with 3-0 chromic suture in a horizontal mattress fashion.    COMPLICATIONS: None    TAKE HOME MEDICATIONS: Tylenol every 6 hours, PRN    DISMISSAL INSTRUCTIONS:  - Ice pack to scrotum 15 to 20 minutes each hour awake for 36 to 40 hours.  - No strenuous activity or ejaculation for at least 7 days.  - No unprotected sexual activity until proven azoospermia on semen samples at 3 months.  - Referred to patient handout for normal postop expectations and indications to contact nurse or physician.    M.D.: Montrell Borrero MD

## 2024-06-28 NOTE — PROGRESS NOTES
OFFICE VASECTOMY OPERATIVE NOTE  [facility]     DATE: 06/28/24  PATIENT: Easton Coyne    YOB: 1980    Easton Coyne is a 43 year old male.  He has 2 children and he wishes a vasectomy for birth control. His wife joins for the procedure. He has read the brochure and he has shaved himself.  I reviewed the vasectomy procedure with him explaining that it would be done with a local anesthetic given just in the location where the vasectomy would be done.  It would be done through incisions with the removal of segments of the vasa, cauterization of the ends, and burying the ends separate with sutures.      Pt. Understands:  -1/1000-1/3000 risk of future pregnancy even with perfectly done vasectomy  -vasectomy is a permanent procedure    -he may cryopreserve sperm if he wishes   -1-5% risk of post-vasectomy pain syndrome   -1-5% risk of complication, primarily infection or bleeding  - he needs to have a semen sample that shows no sperm before getting approval for unprotected intercourse.      Complications such as bleeding, infection, and damage to other tissues in the area were discussed. I recommended that an ice bag be placed on the scrotum off and on tonight to help reduce pain and swelling.      He was reminded that he was not sterile immediately after the vasectomy that it would take at least 20 ejaculations to empty the vas of any remaining sperm.  He was not to provide a semen sample until after the 20th ejaculation and not before 12 weeks after the vas. He was  to fulfill both of those requirements.   He understands it is his responsibility to find out the results of the vas before proceeding with intercourse without birth control protection.  Other items discussed were activity afterwards, returning to work, voluntary physical activity,  resuming sexual activity, clothing to wear, bathing, and care of the vas site and expected changes in the site as healing progresses.  After signing the permit,  bilateral vasectomy was done as described below.     ANESTHESIA: Local    DETAILS OF PROCEDURE: The risks of the procedure were explained in detail to the patient and informed consent was obtained. The patient was placed supine on the procedure table and the penis and scrotum were prepped and draped in the standard sterile fashion. The right vas deferens was isolated and brought up to the skin. 1% lidocaine local anesthesia was used to infiltrate the skin and the spermatic cord. A small incision was created and adventitial tissues were swept away from the vas. A 1 cm segment of the vas was excised and sent for pathology. The proximal and distal lumina of the vas were cauterized and then each segment was tied off in a knuckling-fashion with a 3-0 vicryl suture. Hemostasis was ensured and the segments were released back into the scrotum. Meticulous hemostasis was achieved. The skin was closed with 3-0 chromic suture in a horizontal mattress fashion. Next the left vas was brought to the skin and a vasectomy was performed in the similar fashion. The skin was closed with 3-0 chromic suture in a horizontal mattress fashion.    COMPLICATIONS: None    TAKE HOME MEDICATIONS: Tylenol every 6 hours, PRN    DISMISSAL INSTRUCTIONS:  - Ice pack to scrotum 15 to 20 minutes each hour awake for 36 to 40 hours.  - No strenuous activity or ejaculation for at least 7 days.  - No unprotected sexual activity until proven azoospermia on semen samples at 3 months.  - Referred to patient handout for normal postop expectations and indications to contact nurse or physician.    M.D.: Montrell Borrero MD

## 2024-06-28 NOTE — NURSING NOTE
Pt has signed the consent form today confirming that a VASECTOMY is the correct procedure today. I verbally confirmed the patient s identity using two indicators, that the pt has started any medication as prescribed for this procedure, relevant allergies, that they have not used blood thinning products in the last 7 - 10 days, and that the correct equipment was available. Immediately prior to starting the procedure I conducted the Time Out with the MD and re-confirmed the patient s name and procedure. Pathology ordered and sent to lab. Post-procedure information, also specimen collection cup with instructions, given to pt at time of check out.     MAKEDA Medrano CMA

## 2024-06-28 NOTE — NURSING NOTE
The following medication was given:     MEDICATION:  Lidocaine 1% Soln  ROUTE:  infiltration  SITE: R and L vas deferens  DOSE: 200mg/20mL  LOT #: [id number]  : Jacob  EXPIRATION DATE: 2025-AUG 01-  NDC#: 6083-9573-87   Was there drug waste? Yes  Amount of drug waste (mL): 3.  Reason for waste:  Single use vial  Multi-dose vial: No    MAKEDA Medrano CMA

## 2024-06-28 NOTE — PATIENT INSTRUCTIONS
POST VASECTOMY INSTRUCTIONS    1.) If you have any concerns or questions, please contact our office at [phone] during office hours. If it is after hours, please call [phone].     2.) It is okay to take a shower, however, do not soak in water (bath,swimming, hot tub,etc....) until your incision is healed.    3.) You might notice some swelling, mild bruising, and discomfort for several days after your vasectomy. This is to be expected. For at least the next 24 hours, an ice pack should be applied for 20 minutes every hour that you are awake. Ice will help with discomfort and swelling. Do not place directly on the skin.    4.) No intercourse, strenuous activity or exercise for at least 7-10 days, even if you feel fine.    5.) You need to wear good scrotal support while you are healing. We strongly recommend an athletic supporter or a pair of regular briefs that are one size too small. Boxer briefs do not offer enough support.    6.) Tylenol as directed on the bottle is preferred for discomfort. Please avoid any blood thinning products such as ibuprofen and aspirin (Motrin, Advil, Excedrin, Aleve, ect..) for at least the next week.    7.) It is normal to have mild drainage from the incision area for several days. However, please contact our office if you notice: bright red blood that does not stop after three days, increased pain, heat at the incision, red streaks, foul smelling discharge, or if you start to run a fever.     8.) YOU MUST CONTINUE BIRTH CONTROL UNTIL WE CONFIRM YOUR STERILITY.  This process can take up to a year to complete (rare occurrence).     9.) You have been given a form with specimen cup and instructions for your follow up specimen. You will be cleared once we receive ONE negative specimen. If your specimen comes back positive (sperm seen) you will be asked to repeat the test. This does not mean that your vasectomy has failed.

## 2024-10-12 ENCOUNTER — HEALTH MAINTENANCE LETTER (OUTPATIENT)
Age: 44
End: 2024-10-12